# Patient Record
Sex: FEMALE | Race: OTHER | HISPANIC OR LATINO
[De-identification: names, ages, dates, MRNs, and addresses within clinical notes are randomized per-mention and may not be internally consistent; named-entity substitution may affect disease eponyms.]

---
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
## 2023-10-02 PROBLEM — Z78.9 OTHER SPECIFIED HEALTH STATUS: Chronic | Status: ACTIVE | Noted: 2019-04-26

## 2023-10-05 ENCOUNTER — OUTPATIENT (OUTPATIENT)
Dept: OUTPATIENT SERVICES | Facility: HOSPITAL | Age: 23
LOS: 1 days | End: 2023-10-05

## 2023-10-05 ENCOUNTER — APPOINTMENT (OUTPATIENT)
Dept: INTERNAL MEDICINE | Facility: CLINIC | Age: 23
End: 2023-10-05
Payer: MEDICAID

## 2023-10-05 VITALS
HEART RATE: 84 BPM | RESPIRATION RATE: 16 BRPM | DIASTOLIC BLOOD PRESSURE: 79 MMHG | BODY MASS INDEX: 24.64 KG/M2 | SYSTOLIC BLOOD PRESSURE: 118 MMHG | WEIGHT: 146.13 LBS | HEIGHT: 64.76 IN | OXYGEN SATURATION: 98 %

## 2023-10-05 DIAGNOSIS — Z00.00 ENCOUNTER FOR GENERAL ADULT MEDICAL EXAMINATION W/OUT ABNORMAL FINDINGS: ICD-10-CM

## 2023-10-05 DIAGNOSIS — Z80.3 FAMILY HISTORY OF MALIGNANT NEOPLASM OF BREAST: ICD-10-CM

## 2023-10-05 PROCEDURE — 99385 PREV VISIT NEW AGE 18-39: CPT | Mod: GC

## 2023-10-18 DIAGNOSIS — Z00.00 ENCOUNTER FOR GENERAL ADULT MEDICAL EXAMINATION WITHOUT ABNORMAL FINDINGS: ICD-10-CM

## 2023-10-18 DIAGNOSIS — N63.0 UNSPECIFIED LUMP IN UNSPECIFIED BREAST: ICD-10-CM

## 2023-10-18 DIAGNOSIS — Z80.3 FAMILY HISTORY OF MALIGNANT NEOPLASM OF BREAST: ICD-10-CM

## 2023-10-20 ENCOUNTER — APPOINTMENT (OUTPATIENT)
Dept: ULTRASOUND IMAGING | Facility: IMAGING CENTER | Age: 23
End: 2023-10-20
Payer: MEDICAID

## 2023-10-20 ENCOUNTER — OUTPATIENT (OUTPATIENT)
Dept: OUTPATIENT SERVICES | Facility: HOSPITAL | Age: 23
LOS: 1 days | End: 2023-10-20
Payer: MEDICAID

## 2023-10-20 ENCOUNTER — RESULT REVIEW (OUTPATIENT)
Age: 23
End: 2023-10-20

## 2023-10-20 DIAGNOSIS — Z00.8 ENCOUNTER FOR OTHER GENERAL EXAMINATION: ICD-10-CM

## 2023-10-20 PROCEDURE — 76641 ULTRASOUND BREAST COMPLETE: CPT | Mod: 26,50

## 2023-10-20 PROCEDURE — 76641 ULTRASOUND BREAST COMPLETE: CPT

## 2023-10-26 ENCOUNTER — APPOINTMENT (OUTPATIENT)
Dept: INTERNAL MEDICINE | Facility: CLINIC | Age: 23
End: 2023-10-26

## 2023-10-28 ENCOUNTER — NON-APPOINTMENT (OUTPATIENT)
Age: 23
End: 2023-10-28

## 2023-11-15 PROBLEM — N63.0 LUMP OF BREAST: Status: ACTIVE | Noted: 2023-10-05

## 2023-11-22 ENCOUNTER — RESULT REVIEW (OUTPATIENT)
Age: 23
End: 2023-11-22

## 2023-11-22 ENCOUNTER — APPOINTMENT (OUTPATIENT)
Dept: SURGICAL ONCOLOGY | Facility: CLINIC | Age: 23
End: 2023-11-22
Payer: MEDICAID

## 2023-11-22 VITALS
OXYGEN SATURATION: 99 % | HEART RATE: 67 BPM | DIASTOLIC BLOOD PRESSURE: 72 MMHG | HEIGHT: 65 IN | RESPIRATION RATE: 16 BRPM | WEIGHT: 140 LBS | BODY MASS INDEX: 23.32 KG/M2 | SYSTOLIC BLOOD PRESSURE: 110 MMHG

## 2023-11-22 DIAGNOSIS — N63.0 UNSPECIFIED LUMP IN UNSPECIFIED BREAST: ICD-10-CM

## 2023-11-22 PROCEDURE — 99203 OFFICE O/P NEW LOW 30 MIN: CPT

## 2024-04-01 ENCOUNTER — APPOINTMENT (OUTPATIENT)
Dept: SURGICAL ONCOLOGY | Facility: CLINIC | Age: 24
End: 2024-04-01

## 2024-04-26 ENCOUNTER — RESULT REVIEW (OUTPATIENT)
Age: 24
End: 2024-04-26

## 2024-04-26 ENCOUNTER — APPOINTMENT (OUTPATIENT)
Dept: ULTRASOUND IMAGING | Facility: IMAGING CENTER | Age: 24
End: 2024-04-26
Payer: MEDICAID

## 2024-04-26 ENCOUNTER — OUTPATIENT (OUTPATIENT)
Dept: OUTPATIENT SERVICES | Facility: HOSPITAL | Age: 24
LOS: 1 days | End: 2024-04-26
Payer: MEDICAID

## 2024-04-26 DIAGNOSIS — N63.0 UNSPECIFIED LUMP IN UNSPECIFIED BREAST: ICD-10-CM

## 2024-04-26 PROCEDURE — 76642 ULTRASOUND BREAST LIMITED: CPT | Mod: 26,50

## 2024-04-26 PROCEDURE — 76642 ULTRASOUND BREAST LIMITED: CPT

## 2024-05-01 ENCOUNTER — APPOINTMENT (OUTPATIENT)
Dept: SURGICAL ONCOLOGY | Facility: CLINIC | Age: 24
End: 2024-05-01
Payer: MEDICAID

## 2024-05-01 VITALS
WEIGHT: 143 LBS | BODY MASS INDEX: 23.82 KG/M2 | HEART RATE: 71 BPM | HEIGHT: 65 IN | DIASTOLIC BLOOD PRESSURE: 65 MMHG | SYSTOLIC BLOOD PRESSURE: 107 MMHG | RESPIRATION RATE: 17 BRPM | OXYGEN SATURATION: 98 %

## 2024-05-01 PROCEDURE — 99213 OFFICE O/P EST LOW 20 MIN: CPT

## 2024-05-01 NOTE — PHYSICAL EXAM
[Normocephalic] : normocephalic [Atraumatic] : atraumatic [EOMI] : extra ocular movement intact [PERRL] : pupils equal, round and reactive to light [Sclera nonicteric] : sclera nonicteric [Supple] : supple [No Supraclavicular Adenopathy] : no supraclavicular adenopathy [No Cervical Adenopathy] : no cervical adenopathy [Examined in the supine and seated position] : examined in the supine and seated position [Symmetrical] : symmetrical [Bra Size: ___] : Bra Size: [unfilled] [Grade 2] : Ptosis Grade 2 [No dominant masses] : no dominant masses in right breast  [No dominant masses] : no dominant masses left breast [No Nipple Retraction] : no left nipple retraction [No Nipple Discharge] : no left nipple discharge [Breast Mass Right Breast ___cm] : no masses [Breast Mass Left Breast ___cm] : no masses [No Axillary Lymphadenopathy] : no left axillary lymphadenopathy [No Edema] : no edema [No Rashes] : no rashes [No Ulceration] : no ulceration [Breast Nipple Inversion] : nipples not inverted [Breast Nipple Retraction] : nipples not retracted [Breast Nipple Flattening] : nipples not flattened [Breast Nipple Fissures] : nipples not fissured [de-identified] : non-labored respirations  [de-identified] : B/L fibronodular breasts [de-identified] : at 10:00 N6 palpable mass identified corresponding to US finding. No mass appreciated along 9:00 axis [de-identified] : Nodularity appreciated along 2:00 axis today--small

## 2024-05-01 NOTE — PAST MEDICAL HISTORY
[Menstruating] : The patient is menstruating [Menarche Age ____] : age at menarche was [unfilled] [Definite ___ (Date)] : the last menstrual period was [unfilled] [Regular Cycle Intervals] : have been regular [Total Preg ___] : G[unfilled] [History of Hormone Replacement Treatment] : has no history of hormone replacement treatment [FreeTextEntry6] : Denies [FreeTextEntry7] : Denies [FreeTextEntry8] : N/A

## 2024-05-01 NOTE — ASSESSMENT
[FreeTextEntry1] : Ms. DENISE FAROOQ is a 23-year-old woman, referred by Dr. Nithya Wagner for consultation regarding B/L breast masses, here for an initial visit.   We discussed BIRADS 3 findings, which are considered have a less than or equal to 2% likelihood of malignancy. This category reduces the number of false-positive biopsies and justifies a period of watchful waiting, avoiding unnecessary workup when the likelihood of malignancy is very low. Ultrasound BI-RADS 3 masses will typically undergo a 6-, 12-, and 24-month surveillance protocol to ensure stability and continued benign appearance. After 24 months of stability, the patient may return to routine screening. If during this surveillance period, the mass decreases in size or demonstrates resolution, it can be downgraded to a BI-RADS 2 (benign). If during the surveillance period the mass grows in size or demonstrates suspicious qualities, then the BI-RADS category may be upgraded to a biopsy recommendation.  PLAN:  - B/L U/S 10/2024  - RTO 6 months

## 2024-05-01 NOTE — CONSULT LETTER
[Dear  ___] : Dear  [unfilled], [Consult Letter:] : I had the pleasure of evaluating your patient, [unfilled]. [Please see my note below.] : Please see my note below. [Consult Closing:] : Thank you very much for allowing me to participate in the care of this patient.  If you have any questions, please do not hesitate to contact me. [Sincerely,] : Sincerely, [FreeTextEntry2] : Nithya Wagner MD [FreeTextEntry3] : Tara Maxwell MD Breast Surgeon Division of Surgical Oncology Department of Surgery [address] [place] [address] Tel: [phone] Fax: [phone] Email: [email or web address] [DrYasmine  ___] : Dr. REESE

## 2024-05-01 NOTE — HISTORY OF PRESENT ILLNESS
[FreeTextEntry1] : Ms. DENISE FAROOQ is a 23-year-old woman, referred by Dr. Nithya Wagner for consultation regarding B/L breast masses, here for an initial visit.   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  She developed a large bruise in the area as well as a bump.  Over time the lump got smaller.  However, she went to her PCP for follow-up.  The PCP examined her breast and noted additional lumpiness in both breast, and sent the patient for imaging.  The patient reports that she appreciated the areas of lumpiness pointed out by her PCP, but she is unsure if she still feels the lumps today, or if there have been any changes to the lumps over time.  Most recent imaging: 10/20/2023 B/L U/S (NW) - R 10:00 N6, 9 x 12 mm circumscribed hypoechoic mass - R 12:00 N6, incidental finding of posttraumatic fat necrosis - R 9:00 no sono abnormality at area of concern - L 2:00 N6, 7 x 7 mm circumscribed hypoechoic mass - L 12:30-1:00 no sono abnormality at area of concern  - BR3 -> f/u B/L U/S in 6 months  PMH: Denies PSH: Tonsillectomy/adenoidectomy Meds: Denies ALL: Denies SH: No tobacco.  No EtOH FH: Mother breast cancer age 48.  No other cancers in the family GYN: Menarche 12.  LMP 11/14/2023, regular.  G0, P0.  OCP none.  Fertility none.  HRT none.  4/26/2024 B/L US  - R 10:00 N6 9 x 12 mm hypoechoic nodule, no significant change - R 12:00 N6 finding no longer seen from previous US - L 2:00 N6 7 x 5 mm hypoechoic nodule, no signficant change - B3 -> f/u 6 month US   Interval History: Pt reports that she does not feel any breast masses. No skin changes, no nipple discharge. No recent breast trauma.

## 2024-10-28 ENCOUNTER — APPOINTMENT (OUTPATIENT)
Dept: ULTRASOUND IMAGING | Facility: IMAGING CENTER | Age: 24
End: 2024-10-28

## 2024-11-06 ENCOUNTER — APPOINTMENT (OUTPATIENT)
Dept: SURGICAL ONCOLOGY | Facility: CLINIC | Age: 24
End: 2024-11-06